# Patient Record
Sex: MALE | Race: WHITE | URBAN - METROPOLITAN AREA
[De-identification: names, ages, dates, MRNs, and addresses within clinical notes are randomized per-mention and may not be internally consistent; named-entity substitution may affect disease eponyms.]

---

## 2017-01-06 ENCOUNTER — DOCUMENTATION ONLY (OUTPATIENT)
Dept: LAB | Facility: CLINIC | Age: 32
End: 2017-01-06

## 2017-01-06 ENCOUNTER — OFFICE VISIT (OUTPATIENT)
Dept: FAMILY MEDICINE | Facility: CLINIC | Age: 32
End: 2017-01-06
Payer: COMMERCIAL

## 2017-01-06 VITALS
BODY MASS INDEX: 28.29 KG/M2 | HEART RATE: 69 BPM | WEIGHT: 191 LBS | TEMPERATURE: 97.8 F | HEIGHT: 69 IN | DIASTOLIC BLOOD PRESSURE: 71 MMHG | OXYGEN SATURATION: 100 % | SYSTOLIC BLOOD PRESSURE: 111 MMHG

## 2017-01-06 DIAGNOSIS — F41.9 ANXIETY: Primary | ICD-10-CM

## 2017-01-06 DIAGNOSIS — Z79.899 ENCOUNTER FOR LONG-TERM (CURRENT) USE OF MEDICATIONS: ICD-10-CM

## 2017-01-06 DIAGNOSIS — B35.1 ONYCHOMYCOSIS: ICD-10-CM

## 2017-01-06 DIAGNOSIS — Z79.899 ENCOUNTER FOR LONG-TERM (CURRENT) USE OF MEDICATIONS: Primary | ICD-10-CM

## 2017-01-06 PROCEDURE — 99213 OFFICE O/P EST LOW 20 MIN: CPT | Performed by: INTERNAL MEDICINE

## 2017-01-06 RX ORDER — ESCITALOPRAM OXALATE 10 MG/1
10 TABLET ORAL DAILY
Qty: 90 TABLET | Refills: 1 | Status: SHIPPED | OUTPATIENT
Start: 2017-01-06 | End: 2017-07-13

## 2017-01-06 RX ORDER — ALPRAZOLAM 0.25 MG
0.25 TABLET ORAL 3 TIMES DAILY PRN
Qty: 30 TABLET | Refills: 1 | Status: SHIPPED | OUTPATIENT
Start: 2017-01-06 | End: 2017-05-08

## 2017-01-06 RX ORDER — TERBINAFINE HYDROCHLORIDE 250 MG/1
250 TABLET ORAL DAILY
Qty: 90 TABLET | Refills: 0 | Status: SHIPPED | OUTPATIENT
Start: 2017-01-06 | End: 2018-02-08

## 2017-01-06 ASSESSMENT — ANXIETY QUESTIONNAIRES
1. FEELING NERVOUS, ANXIOUS, OR ON EDGE: NOT AT ALL
2. NOT BEING ABLE TO STOP OR CONTROL WORRYING: NOT AT ALL
7. FEELING AFRAID AS IF SOMETHING AWFUL MIGHT HAPPEN: NOT AT ALL
IF YOU CHECKED OFF ANY PROBLEMS ON THIS QUESTIONNAIRE, HOW DIFFICULT HAVE THESE PROBLEMS MADE IT FOR YOU TO DO YOUR WORK, TAKE CARE OF THINGS AT HOME, OR GET ALONG WITH OTHER PEOPLE: NOT DIFFICULT AT ALL
GAD7 TOTAL SCORE: 0
3. WORRYING TOO MUCH ABOUT DIFFERENT THINGS: NOT AT ALL
5. BEING SO RESTLESS THAT IT IS HARD TO SIT STILL: NOT AT ALL
6. BECOMING EASILY ANNOYED OR IRRITABLE: NOT AT ALL

## 2017-01-06 ASSESSMENT — ENCOUNTER SYMPTOMS
SHORTNESS OF BREATH: 0
SLEEP DISTURBANCE: 0
NERVOUS/ANXIOUS: 0
ABDOMINAL PAIN: 0
FATIGUE: 0
PALPITATIONS: 0

## 2017-01-06 ASSESSMENT — PATIENT HEALTH QUESTIONNAIRE - PHQ9: 5. POOR APPETITE OR OVEREATING: NOT AT ALL

## 2017-01-06 NOTE — MR AVS SNAPSHOT
After Visit Summary   1/6/2017    Сергей Yadav    MRN: [id number]           Patient Information     Date Of Birth          1985        Visit Information        Provider Department      1/6/2017 12:00 PM Rai Watson MD [facility]        Today's Diagnoses     Anxiety    -  1       Care Instructions    Follow up with Psychologist.    Call doctor once you feel ready to get off the medications.        Follow-ups after your visit        Additional Services     MENTAL HEALTH REFERRAL       Your provider has referred you to: [facility]: Psychiatry Clinic [facility] [phone]   [email or web address]    All scheduling is subject to the client's specific insurance plan & benefits, provider/location availability, and provider clinical specialities.  Please arrive 15 minutes early for your first appointment and bring your completed paperwork.    Please be aware that coverage of these services is subject to the terms and limitations of your health insurance plan.  Call member services at your health plan with any benefit or coverage questions.                  Who to contact     If you have questions or need follow up information about today's clinic visit or your schedule please contact [facility] directly at [phone].  Normal or non-critical lab and imaging results will be communicated to you by MyChart, letter or phone within 4 business days after the clinic has received the results. If you do not hear from us within 7 days, please contact the clinic through MyChart or phone. If you have a critical or abnormal lab result, we will notify you by phone as soon as possible.  Submit refill requests through [employer] or call your pharmacy and they will forward the refill request to us. Please allow 3 business days for your refill to be completed.          Additional Information About Your Visit        MyChart Information      "[employer] gives you secure access to your electronic health record. If you see a primary care provider, you can also send messages to your care team and make appointments. If you have questions, please call your primary care clinic.  If you do not have a primary care provider, please call [phone] and they will assist you.        Care EveryWhere ID     This is your Care EveryWhere ID. This could be used by other organizations to access your [facility] medical records  [id number]        Your Vitals Were     Pulse Temperature Height BMI (Body Mass Index) Pulse Oximetry       69 97.8  F (36.6  C) (Tympanic) 5' 8.5\" (1.74 m) 28.62 kg/m2 100%        Blood Pressure from Last 3 Encounters:   01/06/17 111/71   01/29/16 109/70   06/02/14 123/80    Weight from Last 3 Encounters:   01/06/17 191 lb (86.637 kg)   01/29/16 193 lb (87.544 kg)   06/02/14 189 lb 6 oz (85.9 kg)              We Performed the Following     MENTAL HEALTH REFERRAL          Today's Medication Changes          These changes are accurate as of: 1/6/17 12:18 PM.  If you have any questions, ask your nurse or doctor.               These medicines have changed or have updated prescriptions.        Dose/Directions    escitalopram 10 MG tablet   Commonly known as:  LEXAPRO   This may have changed:  See the new instructions.   Used for:  Anxiety   Changed by:  Rai Watson MD        Dose:  10 mg   Take 1 tablet (10 mg) by mouth daily   Quantity:  90 tablet   Refills:  1            Where to get your medicines      These medications were sent to [facility] [address] [facility] [address] AT [place] [id number], [facility] [address]     Phone:  [phone]    - escitalopram 10 MG tablet      Some of these will need a paper prescription and others can be bought over the counter.  Ask your nurse if you have questions.     Bring a paper prescription for each of these medications    - ALPRAZolam 0.25 MG " tablet             Primary Care Provider Office Phone # Fax #    Jojo Dorantes [phone]       [facility] [address] [facility] [address]        Thank you!     Thank you for choosing [facility]  for your care. Our goal is always to provide you with excellent care. Hearing back from our patients is one way we can continue to improve our services. Please take a few minutes to complete the written survey that you may receive in the mail after your visit with us. Thank you!             Your Updated Medication List - Protect others around you: Learn how to safely use, store and throw away your medicines at www.disposemymeds.org.          This list is accurate as of: 1/6/17 12:18 PM.  Always use your most recent med list.                   Brand Name Dispense Instructions for use    ALPRAZolam 0.25 MG tablet    XANAX    30 tablet    Take 1 tablet (0.25 mg) by mouth 3 times daily as needed for anxiety       escitalopram 10 MG tablet    LEXAPRO    90 tablet    Take 1 tablet (10 mg) by mouth daily

## 2017-01-06 NOTE — NURSING NOTE
"Chief Complaint   Patient presents with     Establish Care     Recheck Medication       Initial /71 mmHg  Pulse 69  Temp(Src) 97.8  F (36.6  C) (Tympanic)  Ht 5' 8.5\" (1.74 m)  Wt 191 lb (86.637 kg)  BMI 28.62 kg/m2  SpO2 100% Estimated body mass index is 28.62 kg/(m^2) as calculated from the following:    Height as of this encounter: 5' 8.5\" (1.74 m).    Weight as of this encounter: 191 lb (86.637 kg).  BP completed using cuff size: large  "

## 2017-01-06 NOTE — PROGRESS NOTES
"  SUBJECTIVE:                                                    Сергей Yadav is a 31 year old male who presents to clinic today for the following health issues:      New Patient/Transfer of Care  Medication Followup of Alprazolam     Taking Medication as prescribed: yes    Side Effects:  None    Medication Helping Symptoms:  yes       Patient has a history of anxiety disorder which has been stable on escitalopram and as needed alprazolam. He has been on these medications for about 4 years now and is interested in getting off the medications. He has not worked closely with the counselor/psychologist as far as his anxiety is concerned.    Patient also states that he was previously diagnosed with onychomycosis of his toenails and was contemplating medical treatment; however, the patient deferred but is now interested in pursuing the treatment. No history of liver disorder.      Review of Systems   Constitutional: Negative for fatigue.   Respiratory: Negative for shortness of breath.    Cardiovascular: Negative for chest pain and palpitations.   Gastrointestinal: Negative for abdominal pain.   Psychiatric/Behavioral: Negative for sleep disturbance. The patient is not nervous/anxious.        /71 mmHg  Pulse 69  Temp(Src) 97.8  F (36.6  C) (Tympanic)  Ht 5' 8.5\" (1.74 m)  Wt 191 lb (86.637 kg)  BMI 28.62 kg/m2  SpO2 100%      Physical Exam   Constitutional: He is oriented to person, place, and time. No distress.   Neck: No thyromegaly present.   Cardiovascular: Normal rate, regular rhythm and normal heart sounds.    Abdominal: Soft. He exhibits no mass (no hepatomegaly). There is no tenderness.   Neurological: He is alert and oriented to person, place, and time. GCS score is 15.   Psychiatric: Mood and affect normal.   Vitals reviewed.        ICD-10-CM    1. Anxiety F41.9 ALPRAZolam (XANAX) 0.25 MG tablet     escitalopram (LEXAPRO) 10 MG tablet     MENTAL HEALTH REFERRAL   2. Onychomycosis B35.1 terbinafine " (LAMISIL) 250 MG tablet   3. Encounter for long-term (current) use of medications Z79.899 Hepatic panel (Albumin, ALT, AST, Bili, Alk Phos, TP)         Patient Instructions   Follow up with Psychologist.    Call doctor once you feel ready to get off the medications.

## 2017-01-06 NOTE — PROGRESS NOTES
Pt left without having blood drawn after appointment. Testing has been cancelled. If testing is still needed please replace orders as future and contact patient.     Thank you,  Lab

## 2017-01-07 ASSESSMENT — ANXIETY QUESTIONNAIRES: GAD7 TOTAL SCORE: 0

## 2017-01-07 ASSESSMENT — PATIENT HEALTH QUESTIONNAIRE - PHQ9: SUM OF ALL RESPONSES TO PHQ QUESTIONS 1-9: 2

## 2017-05-08 ENCOUNTER — MYC REFILL (OUTPATIENT)
Dept: FAMILY MEDICINE | Facility: CLINIC | Age: 32
End: 2017-05-08

## 2017-05-08 DIAGNOSIS — F41.9 ANXIETY: ICD-10-CM

## 2017-05-08 NOTE — TELEPHONE ENCOUNTER
Message from [email or web address]:  Original authorizing provider: MD Сергей Lopes would like a refill of the following medications:  ALPRAZolam (XANAX) 0.25 MG tablet [Ria Watson MD]    Preferred pharmacy: [facility] [address] [place] [address] [address]    Comment:  I try to take these as seldom as possible but I still experience anxiety at times and find them very helpful and even reassuring to have on hand.

## 2017-05-08 NOTE — TELEPHONE ENCOUNTER
ALPRAZolam (XANAX) 0.25 MG tablet        Last Written Prescription Date: 1/6/2017  Last Fill Quantity: 30,  # refills: 1   Last Office Visit with [facility], [facility] or [facility] prescribing provider: 1/6/2017

## 2017-05-09 RX ORDER — ALPRAZOLAM 0.25 MG
0.25 TABLET ORAL 3 TIMES DAILY PRN
Qty: 30 TABLET | Refills: 0 | Status: SHIPPED | OUTPATIENT
Start: 2017-05-09

## 2017-05-09 NOTE — TELEPHONE ENCOUNTER
Printed Rx is at the Rx-to-fax bin.  Please schedule patient for follow up to reassess his depression.

## 2017-07-13 DIAGNOSIS — F41.9 ANXIETY: ICD-10-CM

## 2017-07-14 RX ORDER — ESCITALOPRAM OXALATE 10 MG/1
10 TABLET ORAL DAILY
Qty: 90 TABLET | Refills: 1 | Status: SHIPPED | OUTPATIENT
Start: 2017-07-14 | End: 2018-02-02

## 2017-07-14 NOTE — TELEPHONE ENCOUNTER
Pending Prescriptions:                       Disp   Refills    escitalopram (LEXAPRO) 10 MG tablet       90 tab*1            Sig: Take 1 tablet (10 mg) by mouth daily         Last Written Prescription Date: 1/6/17  Last Fill Quantity: 90, # refills: 1  Last Office Visit with [facility] primary care provider:  1/6/17 Watson        Last PHQ-9 score on record=   PHQ-9 SCORE 1/6/2017   Total Score 2       Charlene Edmonds RT(R)

## 2017-07-14 NOTE — TELEPHONE ENCOUNTER
Prescription approved per [facility] Refill Protocol.    Lay Fontaine RN

## 2018-02-02 DIAGNOSIS — F41.9 ANXIETY: ICD-10-CM

## 2018-02-02 NOTE — TELEPHONE ENCOUNTER
"Last Written Prescription Date:  7/14/17  Last Fill Quantity: 90,  # refills: 1   Last Office Visit with [facility] provider:  1/6/17   Future Office Visit:     escitalopram (LEXAPRO) 10 MG tablet 90 tablet 1 7/14/2017       Requested Prescriptions   Pending Prescriptions Disp Refills     escitalopram (LEXAPRO) 10 MG tablet 90 tablet 1     Sig: Take 1 tablet (10 mg) by mouth daily    SSRIs Protocol Failed    2/2/2018  4:02 PM       Failed - Recent or future visit with authorizing provider    Patient had office visit in the last year or has a visit in the next 30 days with authorizing provider.  See \"Patient Info\" tab in inbasket, or \"Choose Columns\" in Meds & Orders section of the refill encounter.            Passed - Patient is age 18 or older        PHQ-9 SCORE 1/6/2017   Total Score 2       "

## 2018-02-02 NOTE — TELEPHONE ENCOUNTER
Left VM for patient to call clinic   He is overdue for follow up with Dr. Watson  Last office visit was 1/6/17    Myranda Alexis RN

## 2018-02-06 RX ORDER — ESCITALOPRAM OXALATE 10 MG/1
10 TABLET ORAL DAILY
Qty: 30 TABLET | Refills: 0 | Status: SHIPPED | OUTPATIENT
Start: 2018-02-06 | End: 2018-02-08

## 2018-02-06 NOTE — TELEPHONE ENCOUNTER
Medication is being filled for 1 time refill only due to:  Patient needs to be seen because it has been more than one year since last visit.   Future OV 2/8  Ludmila Rivas RN

## 2018-02-08 ENCOUNTER — OFFICE VISIT (OUTPATIENT)
Dept: FAMILY MEDICINE | Facility: CLINIC | Age: 33
End: 2018-02-08
Payer: COMMERCIAL

## 2018-02-08 VITALS
SYSTOLIC BLOOD PRESSURE: 117 MMHG | OXYGEN SATURATION: 100 % | TEMPERATURE: 98.1 F | DIASTOLIC BLOOD PRESSURE: 82 MMHG | HEART RATE: 51 BPM

## 2018-02-08 DIAGNOSIS — F41.9 ANXIETY: ICD-10-CM

## 2018-02-08 PROCEDURE — 99214 OFFICE O/P EST MOD 30 MIN: CPT | Performed by: INTERNAL MEDICINE

## 2018-02-08 RX ORDER — ESCITALOPRAM OXALATE 10 MG/1
TABLET ORAL
Qty: 30 TABLET | Refills: 0 | Status: SHIPPED | OUTPATIENT
Start: 2018-02-08 | End: 2018-03-12

## 2018-02-08 ASSESSMENT — ENCOUNTER SYMPTOMS
HALLUCINATIONS: 0
DIARRHEA: 0
DEPRESSION: 0
INSOMNIA: 0
PALPITATIONS: 0
BLURRED VISION: 0
WEIGHT LOSS: 0
VOMITING: 0
HEADACHES: 0
TREMORS: 0
DIZZINESS: 0
NERVOUS/ANXIOUS: 1
ABDOMINAL PAIN: 0
CONSTIPATION: 0
NAUSEA: 0
MYALGIAS: 0

## 2018-02-08 NOTE — MR AVS SNAPSHOT
After Visit Summary   2/8/2018    Сергей Yadav    MRN: [id number]           Patient Information     Date Of Birth          1985        Visit Information        Provider Department      2/8/2018 5:30 PM Rai Watson MD [facility]        Today's Diagnoses     Anxiety          Care Instructions    Wean off Lexapro as follows: 1 tab alternating with half tab every other day x 2 weeks, then half tab daily x 2 weeks, then half tab every other day for 2 weeks, then half tab every 3 days for 1 week, then stop    Call doctor if your anxiety worsens, or if you develop any withdrawal symptoms.            Follow-ups after your visit        Who to contact     If you have questions or need follow up information about today's clinic visit or your schedule please contact [facility] directly at [phone].  Normal or non-critical lab and imaging results will be communicated to you by [employer], letter or phone within 4 business days after the clinic has received the results. If you do not hear from us within 7 days, please contact the clinic through [employer] or phone. If you have a critical or abnormal lab result, we will notify you by phone as soon as possible.  Submit refill requests through [employer] or call your pharmacy and they will forward the refill request to us. Please allow 3 business days for your refill to be completed.          Additional Information About Your Visit        MyChart Information     [employer] gives you secure access to your electronic health record. If you see a primary care provider, you can also send messages to your care team and make appointments. If you have questions, please call your primary care clinic.  If you do not have a primary care provider, please call [phone] and they will assist you.        Care EveryWhere ID     This is your Care EveryWhere ID. This could be used by other organizations to access your [facility]  records  [id number]        Your Vitals Were     Pulse Temperature Pulse Oximetry             51 98.1  F (36.7  C) (Oral) 100%          Blood Pressure from Last 3 Encounters:   02/08/18 117/82   01/06/17 111/71   01/29/16 109/70    Weight from Last 3 Encounters:   01/06/17 191 lb (86.6 kg)   01/29/16 193 lb (87.5 kg)   06/02/14 189 lb 6 oz (85.9 kg)              Today, you had the following     No orders found for display         Today's Medication Changes          These changes are accurate as of 2/8/18  5:43 PM.  If you have any questions, ask your nurse or doctor.               These medicines have changed or have updated prescriptions.        Dose/Directions    escitalopram 10 MG tablet   Commonly known as:  LEXAPRO   This may have changed:    - how much to take  - how to take this  - when to take this  - additional instructions   Used for:  Anxiety   Changed by:  Rai Watson MD        See note to pharmacy   Quantity:  30 tablet   Refills:  0         Stop taking these medicines if you haven't already. Please contact your care team if you have questions.     terbinafine 250 MG tablet   Commonly known as:  lamISIL   Stopped by:  Rai Watson MD                Where to get your medicines      These medications were sent to [facility] [address] [place] [address] [address] AT [facility] [address]  [address] [facility] [address]     Phone:  [phone]     escitalopram 10 MG tablet                Primary Care Provider Office Phone # Fax #    Rai Watson [phone] [address] [facility] [address]  [place] [address]        Equal Access to Services     PAMELA MARSHALL AH: Hadhernán Alaniz, waharpalda luqadaha, qaybta kaalmada adeegyada, nick albert. So [facility] [phone].    ATENCIÓN: Si habla español, tiene a quintana disposición servicios gratuitos de asistencia lingüística. Llame al  [phone].    We comply with applicable federal civil rights laws and [place] laws. We do not discriminate on the basis of race, color, national origin, age, disability, sex, sexual orientation, or gender identity.            Thank you!     Thank you for choosing [facility]  for your care. Our goal is always to provide you with excellent care. Hearing back from our patients is one way we can continue to improve our services. Please take a few minutes to complete the written survey that you may receive in the mail after your visit with us. Thank you!             Your Updated Medication List - Protect others around you: Learn how to safely use, store and throw away your medicines at www.disposemymeds.org.          This list is accurate as of 2/8/18  5:43 PM.  Always use your most recent med list.                   Brand Name Dispense Instructions for use Diagnosis    ALPRAZolam 0.25 MG tablet    XANAX    30 tablet    Take 1 tablet (0.25 mg) by mouth 3 times daily as needed for anxiety    Anxiety       escitalopram 10 MG tablet    LEXAPRO    30 tablet    See note to pharmacy    Anxiety

## 2018-02-08 NOTE — PROGRESS NOTES
HPI    SUBJECTIVE:   Сергей Yadav is a 32 year old male who presents to clinic today for the following health issues:      Medication Followup of Lexapro for Anxiety    Taking Medication as prescribed: yes    Side Effects:  None    Medication Helping Symptoms:  yes       I last saw the patient at the clinic on 1/6/2017 for his anxiety. During the clinic visit, we discussed about his desire to potentially get off the Lexapro, but he never got back to me and tried weaning off the Lexapro on his own during the latter part of the year which did not go well as his anxiety increased as he was weaning off (he also states that it was the busiest quarter at his workplace).  Takes alprazolam very sparingly (his last prescription was May last year).    Patient is again interested in weaning off the medication.  Denies any component of depression.    The previously-prescribed Lamisil cleared up his onychomycosis.      Past Medical History:   Diagnosis Date     Anxiety      CARDIOVASCULAR SCREENING; LDL GOAL LESS THAN 160 2/8/2011       Review of Systems   Constitutional: Negative for malaise/fatigue and weight loss.   Eyes: Negative for blurred vision.   Cardiovascular: Negative for chest pain and palpitations.   Gastrointestinal: Negative for abdominal pain, constipation, diarrhea, nausea and vomiting.   Musculoskeletal: Negative for myalgias.   Neurological: Negative for dizziness, tremors and headaches.   Psychiatric/Behavioral: Negative for depression, hallucinations and suicidal ideas. The patient is nervous/anxious. The patient does not have insomnia.        /82  Pulse 51  Temp 98.1  F (36.7  C) (Oral)  SpO2 100%      Physical Exam   Constitutional: He is oriented to person, place, and time. No distress.   Neck: No thyromegaly present.   Cardiovascular: Normal rate, regular rhythm and normal heart sounds.    Neurological: He is alert and oriented to person, place, and time. GCS score is 15.   No tremors    Psychiatric: Mood and affect normal.   Vitals reviewed.        ICD-10-CM    1. Anxiety F41.9 escitalopram (LEXAPRO) 10 MG tablet     **please refer to HPI for status of conditions      Patient Instructions   Wean off Lexapro as follows: 1 tab alternating with half tab every other day x 2 weeks, then half tab daily x 2 weeks, then half tab every other day for 2 weeks, then half tab every 3 days for 1 week, then stop    Call doctor if your anxiety worsens, or if you develop any withdrawal symptoms.

## 2018-02-08 NOTE — PATIENT INSTRUCTIONS
Wean off Lexapro as follows: 1 tab alternating with half tab every other day x 2 weeks, then half tab daily x 2 weeks, then half tab every other day for 2 weeks, then half tab every 3 days for 1 week, then stop    Call doctor if your anxiety worsens, or if you develop any withdrawal symptoms.

## 2018-03-09 ENCOUNTER — TELEPHONE (OUTPATIENT)
Dept: FAMILY MEDICINE | Facility: CLINIC | Age: 33
End: 2018-03-09

## 2018-03-09 DIAGNOSIS — F41.9 ANXIETY: ICD-10-CM

## 2018-03-09 NOTE — TELEPHONE ENCOUNTER
Called patient and left VM to return call to clinic for question on a medication refill.     No further details left on VM, will await return call.     Megan DOMÍNGUEZ RN

## 2018-03-09 NOTE — TELEPHONE ENCOUNTER
Fax from pharmacy requesting refill of Escitalopram 10mg.  Last filled 2-6-18.    Last Rx 2-8-18 had weaning instructions for patient to stop medication.    Needs triage - did patient stop medication?  Trouble stopping?    LOV 2-8-18 Shirley Oliva RT (R)

## 2018-03-12 RX ORDER — ESCITALOPRAM OXALATE 10 MG/1
TABLET ORAL
Qty: 15 TABLET | Refills: 0 | Status: SHIPPED | OUTPATIENT
Start: 2018-03-12

## 2018-03-12 NOTE — TELEPHONE ENCOUNTER
Second request from pharmacy  No return call from patient yet, he does use MyChart based on past history  Charlotte Oliva RT (R)

## 2018-03-12 NOTE — TELEPHONE ENCOUNTER
I called patient and spoke with him  Patient reports that he is currently taking 1/2 tablet Lexapro 10 mg daily and will continue the weaning schedule per Dr. Watson's instructions but needs more tablets to do so.     PCP: Please see pended prescription. Placed quantity of #15 as that should be enough tablets to finish weaning schedule.     Myranda Alexis RN

## 2019-12-15 ENCOUNTER — HEALTH MAINTENANCE LETTER (OUTPATIENT)
Age: 34
End: 2019-12-15

## 2021-01-15 ENCOUNTER — HEALTH MAINTENANCE LETTER (OUTPATIENT)
Age: 36
End: 2021-01-15

## 2021-10-24 ENCOUNTER — HEALTH MAINTENANCE LETTER (OUTPATIENT)
Age: 36
End: 2021-10-24

## 2022-02-13 ENCOUNTER — HEALTH MAINTENANCE LETTER (OUTPATIENT)
Age: 37
End: 2022-02-13

## 2022-10-16 ENCOUNTER — HEALTH MAINTENANCE LETTER (OUTPATIENT)
Age: 37
End: 2022-10-16

## 2023-03-26 ENCOUNTER — HEALTH MAINTENANCE LETTER (OUTPATIENT)
Age: 38
End: 2023-03-26